# Patient Record
Sex: FEMALE | NOT HISPANIC OR LATINO
[De-identification: names, ages, dates, MRNs, and addresses within clinical notes are randomized per-mention and may not be internally consistent; named-entity substitution may affect disease eponyms.]

---

## 2021-03-17 ENCOUNTER — RESULT REVIEW (OUTPATIENT)
Age: 31
End: 2021-03-17

## 2022-03-14 ENCOUNTER — RESULT REVIEW (OUTPATIENT)
Age: 32
End: 2022-03-14

## 2022-08-03 PROBLEM — Z00.00 ENCOUNTER FOR PREVENTIVE HEALTH EXAMINATION: Status: ACTIVE | Noted: 2022-08-03

## 2022-08-08 ENCOUNTER — ASOB RESULT (OUTPATIENT)
Age: 32
End: 2022-08-08

## 2022-08-08 ENCOUNTER — APPOINTMENT (OUTPATIENT)
Dept: ANTEPARTUM | Facility: CLINIC | Age: 32
End: 2022-08-08

## 2022-08-08 PROCEDURE — 76801 OB US < 14 WKS SINGLE FETUS: CPT

## 2022-08-08 PROCEDURE — 76813 OB US NUCHAL MEAS 1 GEST: CPT

## 2022-09-15 ENCOUNTER — ASOB RESULT (OUTPATIENT)
Age: 32
End: 2022-09-15

## 2022-09-15 ENCOUNTER — APPOINTMENT (OUTPATIENT)
Dept: ANTEPARTUM | Facility: CLINIC | Age: 32
End: 2022-09-15

## 2022-09-15 PROCEDURE — 76805 OB US >/= 14 WKS SNGL FETUS: CPT

## 2022-10-17 ENCOUNTER — ASOB RESULT (OUTPATIENT)
Age: 32
End: 2022-10-17

## 2022-10-17 ENCOUNTER — APPOINTMENT (OUTPATIENT)
Dept: ANTEPARTUM | Facility: CLINIC | Age: 32
End: 2022-10-17

## 2022-10-17 PROCEDURE — 76805 OB US >/= 14 WKS SNGL FETUS: CPT

## 2022-10-17 PROCEDURE — 76817 TRANSVAGINAL US OBSTETRIC: CPT

## 2022-12-05 ENCOUNTER — APPOINTMENT (OUTPATIENT)
Dept: ANTEPARTUM | Facility: CLINIC | Age: 32
End: 2022-12-05

## 2022-12-05 ENCOUNTER — ASOB RESULT (OUTPATIENT)
Age: 32
End: 2022-12-05

## 2022-12-05 PROCEDURE — 76819 FETAL BIOPHYS PROFIL W/O NST: CPT

## 2022-12-05 PROCEDURE — 76820 UMBILICAL ARTERY ECHO: CPT | Mod: 59

## 2022-12-05 PROCEDURE — 76816 OB US FOLLOW-UP PER FETUS: CPT

## 2022-12-20 ENCOUNTER — APPOINTMENT (OUTPATIENT)
Dept: ANTEPARTUM | Facility: CLINIC | Age: 32
End: 2022-12-20

## 2022-12-20 ENCOUNTER — ASOB RESULT (OUTPATIENT)
Age: 32
End: 2022-12-20

## 2022-12-20 PROCEDURE — 76820 UMBILICAL ARTERY ECHO: CPT | Mod: 59

## 2022-12-20 PROCEDURE — 76816 OB US FOLLOW-UP PER FETUS: CPT

## 2022-12-20 PROCEDURE — 76819 FETAL BIOPHYS PROFIL W/O NST: CPT

## 2023-01-10 ENCOUNTER — APPOINTMENT (OUTPATIENT)
Dept: ANTEPARTUM | Facility: CLINIC | Age: 33
End: 2023-01-10
Payer: COMMERCIAL

## 2023-01-10 ENCOUNTER — ASOB RESULT (OUTPATIENT)
Age: 33
End: 2023-01-10

## 2023-01-10 PROCEDURE — 76816 OB US FOLLOW-UP PER FETUS: CPT

## 2023-01-10 PROCEDURE — 76819 FETAL BIOPHYS PROFIL W/O NST: CPT

## 2023-02-02 ENCOUNTER — TRANSCRIPTION ENCOUNTER (OUTPATIENT)
Age: 33
End: 2023-02-02

## 2023-02-03 ENCOUNTER — APPOINTMENT (OUTPATIENT)
Dept: ANTEPARTUM | Facility: CLINIC | Age: 33
End: 2023-02-03
Payer: COMMERCIAL

## 2023-02-03 ENCOUNTER — ASOB RESULT (OUTPATIENT)
Age: 33
End: 2023-02-03

## 2023-02-03 PROCEDURE — 76816 OB US FOLLOW-UP PER FETUS: CPT

## 2023-02-03 PROCEDURE — 76819 FETAL BIOPHYS PROFIL W/O NST: CPT

## 2023-02-18 ENCOUNTER — TRANSCRIPTION ENCOUNTER (OUTPATIENT)
Age: 33
End: 2023-02-18

## 2023-02-18 ENCOUNTER — OUTPATIENT (OUTPATIENT)
Dept: OUTPATIENT SERVICES | Facility: HOSPITAL | Age: 33
LOS: 1 days | End: 2023-02-18
Payer: COMMERCIAL

## 2023-02-18 DIAGNOSIS — Z01.818 ENCOUNTER FOR OTHER PREPROCEDURAL EXAMINATION: ICD-10-CM

## 2023-02-18 LAB
BLD GP AB SCN SERPL QL: NEGATIVE — SIGNIFICANT CHANGE UP
BLD GP AB SCN SERPL QL: NEGATIVE — SIGNIFICANT CHANGE UP
HCT VFR BLD CALC: 38.2 % — SIGNIFICANT CHANGE UP (ref 34.5–45)
HGB BLD-MCNC: 12.4 G/DL — SIGNIFICANT CHANGE UP (ref 11.5–15.5)
MCHC RBC-ENTMCNC: 28.6 PG — SIGNIFICANT CHANGE UP (ref 27–34)
MCHC RBC-ENTMCNC: 32.5 GM/DL — SIGNIFICANT CHANGE UP (ref 32–36)
MCV RBC AUTO: 88.2 FL — SIGNIFICANT CHANGE UP (ref 80–100)
NRBC # BLD: 0 /100 WBCS — SIGNIFICANT CHANGE UP (ref 0–0)
PLATELET # BLD AUTO: 254 K/UL — SIGNIFICANT CHANGE UP (ref 150–400)
RBC # BLD: 4.33 M/UL — SIGNIFICANT CHANGE UP (ref 3.8–5.2)
RBC # FLD: 16 % — HIGH (ref 10.3–14.5)
RH IG SCN BLD-IMP: POSITIVE — SIGNIFICANT CHANGE UP
RH IG SCN BLD-IMP: POSITIVE — SIGNIFICANT CHANGE UP
WBC # BLD: 12.66 K/UL — HIGH (ref 3.8–10.5)
WBC # FLD AUTO: 12.66 K/UL — HIGH (ref 3.8–10.5)

## 2023-02-18 PROCEDURE — 86900 BLOOD TYPING SEROLOGIC ABO: CPT

## 2023-02-18 PROCEDURE — 86850 RBC ANTIBODY SCREEN: CPT

## 2023-02-18 PROCEDURE — 86780 TREPONEMA PALLIDUM: CPT

## 2023-02-18 PROCEDURE — 86901 BLOOD TYPING SEROLOGIC RH(D): CPT

## 2023-02-18 PROCEDURE — 85027 COMPLETE CBC AUTOMATED: CPT

## 2023-02-19 ENCOUNTER — INPATIENT (INPATIENT)
Facility: HOSPITAL | Age: 33
LOS: 1 days | Discharge: HOME | End: 2023-02-21
Attending: OBSTETRICS & GYNECOLOGY | Admitting: OBSTETRICS & GYNECOLOGY
Payer: COMMERCIAL

## 2023-02-19 VITALS — HEIGHT: 63 IN | WEIGHT: 154.1 LBS

## 2023-02-19 DIAGNOSIS — Z3A.00 WEEKS OF GESTATION OF PREGNANCY NOT SPECIFIED: ICD-10-CM

## 2023-02-19 DIAGNOSIS — O26.899 OTHER SPECIFIED PREGNANCY RELATED CONDITIONS, UNSPECIFIED TRIMESTER: ICD-10-CM

## 2023-02-19 LAB
BASOPHILS # BLD AUTO: 0.05 K/UL — SIGNIFICANT CHANGE UP (ref 0–0.2)
BASOPHILS NFR BLD AUTO: 0.4 % — SIGNIFICANT CHANGE UP (ref 0–2)
BLD GP AB SCN SERPL QL: NEGATIVE — SIGNIFICANT CHANGE UP
COVID-19 SPIKE DOMAIN AB INTERP: POSITIVE
COVID-19 SPIKE DOMAIN ANTIBODY RESULT: >250 U/ML — HIGH
EOSINOPHIL # BLD AUTO: 0.03 K/UL — SIGNIFICANT CHANGE UP (ref 0–0.5)
EOSINOPHIL NFR BLD AUTO: 0.2 % — SIGNIFICANT CHANGE UP (ref 0–6)
HCT VFR BLD CALC: 39.7 % — SIGNIFICANT CHANGE UP (ref 34.5–45)
HGB BLD-MCNC: 13 G/DL — SIGNIFICANT CHANGE UP (ref 11.5–15.5)
IMM GRANULOCYTES NFR BLD AUTO: 1.6 % — HIGH (ref 0–0.9)
LYMPHOCYTES # BLD AUTO: 1.95 K/UL — SIGNIFICANT CHANGE UP (ref 1–3.3)
LYMPHOCYTES # BLD AUTO: 13.9 % — SIGNIFICANT CHANGE UP (ref 13–44)
MCHC RBC-ENTMCNC: 28.4 PG — SIGNIFICANT CHANGE UP (ref 27–34)
MCHC RBC-ENTMCNC: 32.7 GM/DL — SIGNIFICANT CHANGE UP (ref 32–36)
MCV RBC AUTO: 86.7 FL — SIGNIFICANT CHANGE UP (ref 80–100)
MONOCYTES # BLD AUTO: 0.63 K/UL — SIGNIFICANT CHANGE UP (ref 0–0.9)
MONOCYTES NFR BLD AUTO: 4.5 % — SIGNIFICANT CHANGE UP (ref 2–14)
NEUTROPHILS # BLD AUTO: 11.12 K/UL — HIGH (ref 1.8–7.4)
NEUTROPHILS NFR BLD AUTO: 79.4 % — HIGH (ref 43–77)
NRBC # BLD: 0 /100 WBCS — SIGNIFICANT CHANGE UP (ref 0–0)
PLATELET # BLD AUTO: 265 K/UL — SIGNIFICANT CHANGE UP (ref 150–400)
RBC # BLD: 4.58 M/UL — SIGNIFICANT CHANGE UP (ref 3.8–5.2)
RBC # FLD: 15.5 % — HIGH (ref 10.3–14.5)
RH IG SCN BLD-IMP: POSITIVE — SIGNIFICANT CHANGE UP
SARS-COV-2 IGG+IGM SERPL QL IA: >250 U/ML — HIGH
SARS-COV-2 IGG+IGM SERPL QL IA: POSITIVE
T PALLIDUM AB TITR SER: NEGATIVE — SIGNIFICANT CHANGE UP
WBC # BLD: 14 K/UL — HIGH (ref 3.8–10.5)
WBC # FLD AUTO: 14 K/UL — HIGH (ref 3.8–10.5)

## 2023-02-19 PROCEDURE — 88307 TISSUE EXAM BY PATHOLOGIST: CPT | Mod: 26

## 2023-02-19 DEVICE — SEPRAFILM 5 X 6": Type: IMPLANTABLE DEVICE | Status: FUNCTIONAL

## 2023-02-19 RX ORDER — DEXAMETHASONE 0.5 MG/5ML
4 ELIXIR ORAL EVERY 6 HOURS
Refills: 0 | Status: DISCONTINUED | OUTPATIENT
Start: 2023-02-19 | End: 2023-02-19

## 2023-02-19 RX ORDER — SIMETHICONE 80 MG/1
80 TABLET, CHEWABLE ORAL EVERY 4 HOURS
Refills: 0 | Status: DISCONTINUED | OUTPATIENT
Start: 2023-02-19 | End: 2023-02-21

## 2023-02-19 RX ORDER — ONDANSETRON 8 MG/1
4 TABLET, FILM COATED ORAL EVERY 6 HOURS
Refills: 0 | Status: DISCONTINUED | OUTPATIENT
Start: 2023-02-19 | End: 2023-02-19

## 2023-02-19 RX ORDER — NALOXONE HYDROCHLORIDE 4 MG/.1ML
0.1 SPRAY NASAL
Refills: 0 | Status: DISCONTINUED | OUTPATIENT
Start: 2023-02-19 | End: 2023-02-19

## 2023-02-19 RX ORDER — MAGNESIUM HYDROXIDE 400 MG/1
30 TABLET, CHEWABLE ORAL
Refills: 0 | Status: DISCONTINUED | OUTPATIENT
Start: 2023-02-19 | End: 2023-02-21

## 2023-02-19 RX ORDER — SODIUM CHLORIDE 9 MG/ML
1000 INJECTION, SOLUTION INTRAVENOUS
Refills: 0 | Status: DISCONTINUED | OUTPATIENT
Start: 2023-02-19 | End: 2023-02-19

## 2023-02-19 RX ORDER — OXYCODONE HYDROCHLORIDE 5 MG/1
5 TABLET ORAL ONCE
Refills: 0 | Status: DISCONTINUED | OUTPATIENT
Start: 2023-02-19 | End: 2023-02-21

## 2023-02-19 RX ORDER — IBUPROFEN 200 MG
600 TABLET ORAL EVERY 6 HOURS
Refills: 0 | Status: COMPLETED | OUTPATIENT
Start: 2023-02-19 | End: 2024-01-18

## 2023-02-19 RX ORDER — DIPHENHYDRAMINE HCL 50 MG
25 CAPSULE ORAL EVERY 6 HOURS
Refills: 0 | Status: DISCONTINUED | OUTPATIENT
Start: 2023-02-19 | End: 2023-02-21

## 2023-02-19 RX ORDER — CITRIC ACID/SODIUM CITRATE 300-500 MG
30 SOLUTION, ORAL ORAL ONCE
Refills: 0 | Status: COMPLETED | OUTPATIENT
Start: 2023-02-19 | End: 2023-02-19

## 2023-02-19 RX ORDER — TETANUS TOXOID, REDUCED DIPHTHERIA TOXOID AND ACELLULAR PERTUSSIS VACCINE, ADSORBED 5; 2.5; 8; 8; 2.5 [IU]/.5ML; [IU]/.5ML; UG/.5ML; UG/.5ML; UG/.5ML
0.5 SUSPENSION INTRAMUSCULAR ONCE
Refills: 0 | Status: DISCONTINUED | OUTPATIENT
Start: 2023-02-19 | End: 2023-02-21

## 2023-02-19 RX ORDER — KETOROLAC TROMETHAMINE 30 MG/ML
30 SYRINGE (ML) INJECTION EVERY 6 HOURS
Refills: 0 | Status: DISCONTINUED | OUTPATIENT
Start: 2023-02-19 | End: 2023-02-20

## 2023-02-19 RX ORDER — ACETAMINOPHEN 500 MG
975 TABLET ORAL
Refills: 0 | Status: DISCONTINUED | OUTPATIENT
Start: 2023-02-19 | End: 2023-02-21

## 2023-02-19 RX ORDER — OXYCODONE HYDROCHLORIDE 5 MG/1
5 TABLET ORAL
Refills: 0 | Status: COMPLETED | OUTPATIENT
Start: 2023-02-19 | End: 2023-02-26

## 2023-02-19 RX ORDER — OXYTOCIN 10 UNIT/ML
333.33 VIAL (ML) INJECTION
Qty: 20 | Refills: 0 | Status: DISCONTINUED | OUTPATIENT
Start: 2023-02-19 | End: 2023-02-19

## 2023-02-19 RX ORDER — CEFAZOLIN SODIUM 1 G
2000 VIAL (EA) INJECTION ONCE
Refills: 0 | Status: COMPLETED | OUTPATIENT
Start: 2023-02-19 | End: 2023-02-19

## 2023-02-19 RX ORDER — ENOXAPARIN SODIUM 100 MG/ML
40 INJECTION SUBCUTANEOUS EVERY 24 HOURS
Refills: 0 | Status: DISCONTINUED | OUTPATIENT
Start: 2023-02-19 | End: 2023-02-21

## 2023-02-19 RX ORDER — FAMOTIDINE 10 MG/ML
20 INJECTION INTRAVENOUS ONCE
Refills: 0 | Status: COMPLETED | OUTPATIENT
Start: 2023-02-19 | End: 2023-02-19

## 2023-02-19 RX ORDER — LANOLIN
1 OINTMENT (GRAM) TOPICAL EVERY 6 HOURS
Refills: 0 | Status: DISCONTINUED | OUTPATIENT
Start: 2023-02-19 | End: 2023-02-21

## 2023-02-19 RX ORDER — OXYTOCIN 10 UNIT/ML
333.33 VIAL (ML) INJECTION
Qty: 20 | Refills: 0 | Status: DISCONTINUED | OUTPATIENT
Start: 2023-02-19 | End: 2023-02-21

## 2023-02-19 RX ORDER — SODIUM CHLORIDE 9 MG/ML
1000 INJECTION, SOLUTION INTRAVENOUS ONCE
Refills: 0 | Status: COMPLETED | OUTPATIENT
Start: 2023-02-19 | End: 2023-02-19

## 2023-02-19 RX ORDER — SODIUM CHLORIDE 9 MG/ML
1000 INJECTION, SOLUTION INTRAVENOUS
Refills: 0 | Status: DISCONTINUED | OUTPATIENT
Start: 2023-02-19 | End: 2023-02-21

## 2023-02-19 RX ADMIN — SIMETHICONE 80 MILLIGRAM(S): 80 TABLET, CHEWABLE ORAL at 21:22

## 2023-02-19 RX ADMIN — SODIUM CHLORIDE 2000 MILLILITER(S): 9 INJECTION, SOLUTION INTRAVENOUS at 08:00

## 2023-02-19 RX ADMIN — Medication 975 MILLIGRAM(S): at 21:22

## 2023-02-19 RX ADMIN — Medication 975 MILLIGRAM(S): at 22:16

## 2023-02-19 RX ADMIN — Medication 30 MILLIGRAM(S): at 12:40

## 2023-02-19 RX ADMIN — Medication 30 MILLIGRAM(S): at 18:00

## 2023-02-19 RX ADMIN — ENOXAPARIN SODIUM 40 MILLIGRAM(S): 100 INJECTION SUBCUTANEOUS at 22:16

## 2023-02-19 RX ADMIN — Medication 30 MILLIGRAM(S): at 11:26

## 2023-02-19 RX ADMIN — FAMOTIDINE 20 MILLIGRAM(S): 10 INJECTION INTRAVENOUS at 08:21

## 2023-02-19 RX ADMIN — Medication 100 MILLIGRAM(S): at 08:33

## 2023-02-19 RX ADMIN — Medication 30 MILLILITER(S): at 08:32

## 2023-02-19 RX ADMIN — Medication 30 MILLIGRAM(S): at 17:32

## 2023-02-19 NOTE — LACTATION INITIAL EVALUATION - NS LACT CON REASON FOR REQ
39.3 wk baby seen around 10 hours of life. Upon oral eval, mandibular tightness noted, along with a short/tight lingual band toward posterior aspect of tongue.  Will continue to be monitored by team as needed, based on the progression of breastfeeding success. At this time no further measures are necessary other than continuing to perfect positioning and latch with discussed techniques. Parents are encouraged to discuss this finding with their pediatrician and be mindful of difficulties with latch on/sustaining a latch, milk transfer, weight gain, or maternal pain while nursing. Baby placed skin to skin on mothers chest. Complete breastfeeding education was provided, manual expression technique taught with proper return demo, honey thick colostrum  expressible bilaterally. Positioning and latch strategies were taught. In a cross cradle hold, baby deeply latched and developed an organized, rhythmic suck on the left breast with periodic "pop offs."  A latch on the right side was not maintained, as that nipple/areolar complex is dense and not as compressible as the left side.  A size 20 nipple shield was introduced. Baby continues to feed for approxiamtely 10 min combined on both sides.  A double electric breast pump was introduced for further stimulation and parents were taught how to deliver whatever colostrum is removed in a breast friendly way (finger feeding).  Responsive/ frequent feeds, continued/ increased skin to skin holding, and rooming-in were encouraged. Expectations surrounding intake and output at this point and moving forward were discussed.  Implications of a shallow vs deep latch were also discussed with regard to milk transfer and maternal comfort/nipple integrity. All questions were answered, mother verbalized understanding of teaching and info given. Referred for telelactation for continued support in the home setting.

## 2023-02-19 NOTE — LACTATION INITIAL EVALUATION - LACTATION INTERVENTIONS
initiate/review safe skin-to-skin/initiate/review hand expression/initiate/review techniques for position and latch/initiate/review nipple shield use/initiate/review finger suck/initiate/review breast massage/compression/initiate/review alternate feeding method/reviewed components of an effective feeding and at least 8 effective feedings per day required/reviewed importance of monitoring infant diapers, the breastfeeding log, and minimum output each day/reviewed benefits and recommendations for rooming in/reviewed feeding on demand/by cue at least 8 times a day

## 2023-02-20 ENCOUNTER — TRANSCRIPTION ENCOUNTER (OUTPATIENT)
Age: 33
End: 2023-02-20

## 2023-02-20 LAB
BASOPHILS # BLD AUTO: 0.06 K/UL — SIGNIFICANT CHANGE UP (ref 0–0.2)
BASOPHILS NFR BLD AUTO: 0.4 % — SIGNIFICANT CHANGE UP (ref 0–2)
EOSINOPHIL # BLD AUTO: 0.07 K/UL — SIGNIFICANT CHANGE UP (ref 0–0.5)
EOSINOPHIL NFR BLD AUTO: 0.4 % — SIGNIFICANT CHANGE UP (ref 0–6)
HCT VFR BLD CALC: 32.9 % — LOW (ref 34.5–45)
HGB BLD-MCNC: 10.5 G/DL — LOW (ref 11.5–15.5)
IMM GRANULOCYTES NFR BLD AUTO: 1 % — HIGH (ref 0–0.9)
LYMPHOCYTES # BLD AUTO: 1.94 K/UL — SIGNIFICANT CHANGE UP (ref 1–3.3)
LYMPHOCYTES # BLD AUTO: 12.4 % — LOW (ref 13–44)
MCHC RBC-ENTMCNC: 28.7 PG — SIGNIFICANT CHANGE UP (ref 27–34)
MCHC RBC-ENTMCNC: 31.9 GM/DL — LOW (ref 32–36)
MCV RBC AUTO: 89.9 FL — SIGNIFICANT CHANGE UP (ref 80–100)
MONOCYTES # BLD AUTO: 0.76 K/UL — SIGNIFICANT CHANGE UP (ref 0–0.9)
MONOCYTES NFR BLD AUTO: 4.9 % — SIGNIFICANT CHANGE UP (ref 2–14)
NEUTROPHILS # BLD AUTO: 12.64 K/UL — HIGH (ref 1.8–7.4)
NEUTROPHILS NFR BLD AUTO: 80.9 % — HIGH (ref 43–77)
NRBC # BLD: 0 /100 WBCS — SIGNIFICANT CHANGE UP (ref 0–0)
PLATELET # BLD AUTO: 219 K/UL — SIGNIFICANT CHANGE UP (ref 150–400)
RBC # BLD: 3.66 M/UL — LOW (ref 3.8–5.2)
RBC # FLD: 15.8 % — HIGH (ref 10.3–14.5)
T PALLIDUM AB TITR SER: NEGATIVE — SIGNIFICANT CHANGE UP
WBC # BLD: 15.62 K/UL — HIGH (ref 3.8–10.5)
WBC # FLD AUTO: 15.62 K/UL — HIGH (ref 3.8–10.5)

## 2023-02-20 RX ORDER — OXYCODONE HYDROCHLORIDE 5 MG/1
5 TABLET ORAL
Refills: 0 | Status: DISCONTINUED | OUTPATIENT
Start: 2023-02-20 | End: 2023-02-21

## 2023-02-20 RX ORDER — IBUPROFEN 200 MG
600 TABLET ORAL EVERY 6 HOURS
Refills: 0 | Status: DISCONTINUED | OUTPATIENT
Start: 2023-02-20 | End: 2023-02-21

## 2023-02-20 RX ADMIN — Medication 600 MILLIGRAM(S): at 23:49

## 2023-02-20 RX ADMIN — Medication 975 MILLIGRAM(S): at 08:05

## 2023-02-20 RX ADMIN — Medication 600 MILLIGRAM(S): at 18:32

## 2023-02-20 RX ADMIN — Medication 975 MILLIGRAM(S): at 03:01

## 2023-02-20 RX ADMIN — Medication 600 MILLIGRAM(S): at 11:43

## 2023-02-20 RX ADMIN — Medication 30 MILLIGRAM(S): at 06:09

## 2023-02-20 RX ADMIN — Medication 975 MILLIGRAM(S): at 22:23

## 2023-02-20 RX ADMIN — Medication 975 MILLIGRAM(S): at 08:45

## 2023-02-20 RX ADMIN — Medication 30 MILLIGRAM(S): at 00:00

## 2023-02-20 RX ADMIN — ENOXAPARIN SODIUM 40 MILLIGRAM(S): 100 INJECTION SUBCUTANEOUS at 21:34

## 2023-02-20 RX ADMIN — Medication 975 MILLIGRAM(S): at 21:34

## 2023-02-20 RX ADMIN — Medication 30 MILLIGRAM(S): at 00:31

## 2023-02-20 RX ADMIN — Medication 30 MILLIGRAM(S): at 06:47

## 2023-02-20 RX ADMIN — Medication 975 MILLIGRAM(S): at 03:38

## 2023-02-20 RX ADMIN — Medication 600 MILLIGRAM(S): at 12:30

## 2023-02-20 RX ADMIN — SIMETHICONE 80 MILLIGRAM(S): 80 TABLET, CHEWABLE ORAL at 17:55

## 2023-02-20 RX ADMIN — Medication 600 MILLIGRAM(S): at 17:56

## 2023-02-20 RX ADMIN — Medication 975 MILLIGRAM(S): at 14:46

## 2023-02-20 RX ADMIN — Medication 975 MILLIGRAM(S): at 15:33

## 2023-02-20 NOTE — DISCHARGE NOTE OB - MATERIALS PROVIDED
Guide to Postpartum Care/[facility] Hearing Screen Program/Back To Sleep Handout/Shaken Baby Prevention Handout/Breastfeeding Guide and Packet

## 2023-02-20 NOTE — DISCHARGE NOTE OB - CARE PLAN
1 Principal Discharge DX:	 delivery delivered  Assessment and plan of treatment:	 delivery, meeting all postoperative milestones.  Follow up in 1-2 weeks.

## 2023-02-20 NOTE — DISCHARGE NOTE OB - CARE PROVIDER_API CALL
Lupe Davis)  Obstetrics and Gynecology  [address] [place] [id number]  Phone: [phone]  Fax: [phone]  Follow Up Time:

## 2023-02-20 NOTE — DISCHARGE NOTE OB - PATIENT PORTAL LINK FT
You can access the FollowMyHealth Patient Portal offered by [facility] by registering at the following website: [email or web address]. By joining [facility]’s FollowMyHealth portal, you will also be able to view your health information using other applications (apps) compatible with our system.

## 2023-02-21 VITALS
TEMPERATURE: 98 F | SYSTOLIC BLOOD PRESSURE: 111 MMHG | OXYGEN SATURATION: 100 % | RESPIRATION RATE: 18 BRPM | HEART RATE: 100 BPM | DIASTOLIC BLOOD PRESSURE: 77 MMHG

## 2023-02-21 RX ADMIN — Medication 600 MILLIGRAM(S): at 07:20

## 2023-02-21 RX ADMIN — Medication 600 MILLIGRAM(S): at 06:44

## 2023-02-21 RX ADMIN — Medication 975 MILLIGRAM(S): at 09:04

## 2023-02-21 RX ADMIN — Medication 600 MILLIGRAM(S): at 12:52

## 2023-02-21 RX ADMIN — Medication 975 MILLIGRAM(S): at 02:53

## 2023-02-21 RX ADMIN — Medication 600 MILLIGRAM(S): at 00:30

## 2023-02-21 RX ADMIN — Medication 975 MILLIGRAM(S): at 09:30

## 2023-02-21 RX ADMIN — Medication 975 MILLIGRAM(S): at 03:30

## 2023-02-21 NOTE — PROGRESS NOTE ADULT - SUBJECTIVE AND OBJECTIVE BOX
Patient evaluated at bedside. No acute events overnight. She reports pain is well controlled with OPM. She denies headache, dizziness, chest pain, palpitations, shortness of breath, nausea, vomiting or heavy vaginal bleeding.      Physical Exam:  Vital Signs Last 24 Hrs  T(C): 36.6 (21 Feb 2023 05:39), Max: 36.8 (20 Feb 2023 10:00)  T(F): 97.8 (21 Feb 2023 05:39), Max: 98.2 (20 Feb 2023 10:00)  HR: 72 (21 Feb 2023 05:39) (72 - 80)  BP: 104/67 (21 Feb 2023 05:39) (98/61 - 124/81)  BP(mean): --  RR: 18 (21 Feb 2023 05:39) (16 - 18)  SpO2: 98% (21 Feb 2023 05:39) (98% - 100%)    Parameters below as of 20 Feb 2023 21:21  Patient On (Oxygen Delivery Method): room air        GA: NAD, A+0 x 3  Abd: + BS, soft, nontender, nondistended, no rebound or guarding, uterus firm at midline, 2 fb below umbilicus  Incision clean, dry and intact  : lochia WNL  Extremities: no calf tenderness                            10.5   15.62 )-----------( 219      ( 20 Feb 2023 05:30 )             32.9     A/P  yo 32y s/p c/s, POD #3  ,stable    Diet: regular  Pain: OPM  OOB/SCDs/IS  Continue to monitor  Anticipate discharge, precautions reviewed          
Patient evaluated at bedside this morning, resting comfortable in bed.   She reports pain is well controlled with Tylenol and Motrin.   Reports decrease in amount of vaginal bleeding  She has been ambulating without assistance, voiding spontaneously, passing gas, and tolerating regular diet without nausea/vomiting.    Physical Exam:  Vital Signs Last 24 Hrs  T(C): 36.5 (20 Feb 2023 21:21), Max: 36.8 (20 Feb 2023 10:00)  T(F): 97.7 (20 Feb 2023 21:21), Max: 98.2 (20 Feb 2023 10:00)  HR: 76 (20 Feb 2023 21:21) (71 - 80)  BP: 113/74 (20 Feb 2023 21:21) (98/61 - 124/81)  BP(mean): 72 (20 Feb 2023 05:50) (72 - 72)  RR: 18 (20 Feb 2023 21:21) (16 - 18)  SpO2: 99% (20 Feb 2023 21:21) (98% - 100%)    Parameters below as of 20 Feb 2023 21:21  Patient On (Oxygen Delivery Method): room air        GA: Alert, comfortable, NAD  Pulm: Breathing comfortably on RA  Abd: Uterine fundus firm, tenderness appropriate with post-op state, incision clean/dry/intact  Extremities: no swelling or calf tenderness                             10.5   15.62 )-----------( 219      ( 20 Feb 2023 05:30 )             32.9               
Patient evaluated at bedside this morning, resting comfortable in bed, with no acute events overnight.  She reports pain is well controlled.  She denies headache, dizziness, chest pain, palpitations, shortness of breathe, nausea, vomiting or heavy vaginal bleeding.  She has not tried ambulating since procedure, cody was removed at this time. Tolerating clear liquids.     Physical Exam:  Vital Signs Last 24 Hrs  T(C): 36.8 (20 Feb 2023 10:00), Max: 37.3 (19 Feb 2023 18:20)  T(F): 98.2 (20 Feb 2023 10:00), Max: 99.2 (19 Feb 2023 18:20)  HR: 77 (20 Feb 2023 10:00) (71 - 83)  BP: 98/61 (20 Feb 2023 10:00) (98/61 - 123/83)  BP(mean): 72 (20 Feb 2023 05:50) (72 - 72)  RR: 17 (20 Feb 2023 10:00) (17 - 18)  SpO2: 99% (20 Feb 2023 10:00) (96% - 99%)    Parameters below as of 20 Feb 2023 05:50  Patient On (Oxygen Delivery Method): room air        GA: NAD, A+0 x 3  Abd: soft, nontender, nondistended, no rebound or guarding, incision clean, dry and intact, uterus firm at midline, fb below umbilicus  :  lochia WNL  Extremities: no swelling or calf tenderness, reflexes +2 bilaterally.                            10.5   15.62 )-----------( 219      ( 20 Feb 2023 05:30 )             32.9               acetaminophen     Tablet .. 975 milliGRAM(s) Oral <User Schedule>  diphenhydrAMINE 25 milliGRAM(s) Oral every 6 hours PRN  diphtheria/tetanus/pertussis (acellular) Vaccine (Adacel) 0.5 milliLiter(s) IntraMuscular once  enoxaparin Injectable 40 milliGRAM(s) SubCutaneous every 24 hours  ibuprofen  Tablet. 600 milliGRAM(s) Oral every 6 hours  lactated ringers. 1000 milliLiter(s) IV Continuous <Continuous>  lanolin Ointment 1 Application(s) Topical every 6 hours PRN  magnesium hydroxide Suspension 30 milliLiter(s) Oral two times a day PRN  oxyCODONE    IR 5 milliGRAM(s) Oral once PRN  oxyCODONE    IR 5 milliGRAM(s) Oral every 3 hours PRN  oxytocin Infusion 333.333 milliUNIT(s)/Min IV Continuous <Continuous>  simethicone 80 milliGRAM(s) Chew every 4 hours PRN  
pt seen and examined  doing well  good pain control with OPM  oob  ICU Vital Signs Last 24 Hrs  T(C): 36.6 (20 Feb 2023 05:50), Max: 37.3 (19 Feb 2023 18:20)  T(F): 97.9 (20 Feb 2023 05:50), Max: 99.2 (19 Feb 2023 18:20)  HR: 71 (20 Feb 2023 05:50) (71 - 83)  BP: 99/58 (20 Feb 2023 05:50) (99/58 - 123/83)  BP(mean): 72 (20 Feb 2023 05:50) (72 - 72)  RR: 18 (20 Feb 2023 05:50) (18 - 18)  SpO2: 98% (20 Feb 2023 05:50) (96% - 100%)    O2 Parameters below as of 20 Feb 2023 05:50  Patient On (Oxygen Delivery Method): room air  abd soft and nd  incision c/d/i  nl lochia  neg calf tenderness                          10.5   15.62 )-----------( 219      ( 20 Feb 2023 05:30 )             32.9       POD1 s/p c/s for breech and oligohydramnios  oob  reg diet  encourage walking  -dr miguel arzola

## 2023-02-21 NOTE — PROGRESS NOTE ADULT - ASSESSMENT
A/P: 32y s/p  section, POD#2, stable  -  Pain: PO motrin and Tylenol, oxycodone for severe pain PRN  -  GI: tolerating regular diet, passing gas  -  : s/p cody , urinating without difficulty  -  DVT prophylaxis: encouraged increased ambulation, SCDs, SQL  -  Dispo: POD 3 or 4
A/P   32y  s/p  section, POD #1, stable  -  Pain: PO motrin q6h, Tylenol q6h, oxycodone for severe pain PRN  -  Post-operatively labs: post-op Hgb  -  GI: tolerating clears, passing gas, ADAT  -  : s/p cody, f/u TOV  -  DVT prophylaxis: ambulation, SCDs, Lovenox  -  Dispo: POD 3 or 4
91.2

## 2023-02-24 DIAGNOSIS — Z3A.39 39 WEEKS GESTATION OF PREGNANCY: ICD-10-CM

## 2023-02-24 DIAGNOSIS — E03.8 OTHER SPECIFIED HYPOTHYROIDISM: ICD-10-CM

## 2023-02-24 DIAGNOSIS — Z34.83 ENCOUNTER FOR SUPERVISION OF OTHER NORMAL PREGNANCY, THIRD TRIMESTER: ICD-10-CM

## 2023-02-24 DIAGNOSIS — O41.03X0 OLIGOHYDRAMNIOS, THIRD TRIMESTER, NOT APPLICABLE OR UNSPECIFIED: ICD-10-CM

## 2023-03-01 LAB — SURGICAL PATHOLOGY STUDY: SIGNIFICANT CHANGE UP
